# Patient Record
Sex: FEMALE | Race: BLACK OR AFRICAN AMERICAN
[De-identification: names, ages, dates, MRNs, and addresses within clinical notes are randomized per-mention and may not be internally consistent; named-entity substitution may affect disease eponyms.]

---

## 2020-04-03 ENCOUNTER — HOSPITAL ENCOUNTER (EMERGENCY)
Dept: HOSPITAL 62 - ER | Age: 40
Discharge: HOME | End: 2020-04-03
Payer: MEDICAID

## 2020-04-03 VITALS — DIASTOLIC BLOOD PRESSURE: 60 MMHG | SYSTOLIC BLOOD PRESSURE: 132 MMHG

## 2020-04-03 DIAGNOSIS — D25.9: ICD-10-CM

## 2020-04-03 DIAGNOSIS — R61: ICD-10-CM

## 2020-04-03 DIAGNOSIS — Z98.51: ICD-10-CM

## 2020-04-03 DIAGNOSIS — D72.829: ICD-10-CM

## 2020-04-03 DIAGNOSIS — N93.8: Primary | ICD-10-CM

## 2020-04-03 DIAGNOSIS — R51: ICD-10-CM

## 2020-04-03 DIAGNOSIS — F17.210: ICD-10-CM

## 2020-04-03 LAB
ADD MANUAL DIFF: NO
ALBUMIN SERPL-MCNC: 4.7 G/DL (ref 3.5–5)
ALP SERPL-CCNC: 69 U/L (ref 38–126)
ANION GAP SERPL CALC-SCNC: 8 MMOL/L (ref 5–19)
APPEARANCE UR: (no result)
APTT PPP: YELLOW S
AST SERPL-CCNC: 20 U/L (ref 14–36)
BASOPHILS # BLD AUTO: 0.1 10^3/UL (ref 0–0.2)
BASOPHILS NFR BLD AUTO: 0.5 % (ref 0–2)
BILIRUB DIRECT SERPL-MCNC: 0 MG/DL (ref 0–0.4)
BILIRUB SERPL-MCNC: 0.4 MG/DL (ref 0.2–1.3)
BILIRUB UR QL STRIP: NEGATIVE
BUN SERPL-MCNC: 12 MG/DL (ref 7–20)
CALCIUM: 10.1 MG/DL (ref 8.4–10.2)
CHLORIDE SERPL-SCNC: 101 MMOL/L (ref 98–107)
CO2 SERPL-SCNC: 29 MMOL/L (ref 22–30)
EOSINOPHIL # BLD AUTO: 0 10^3/UL (ref 0–0.6)
EOSINOPHIL NFR BLD AUTO: 0.2 % (ref 0–6)
ERYTHROCYTE [DISTWIDTH] IN BLOOD BY AUTOMATED COUNT: 14 % (ref 11.5–14)
GLUCOSE SERPL-MCNC: 105 MG/DL (ref 75–110)
GLUCOSE UR STRIP-MCNC: NEGATIVE MG/DL
HCT VFR BLD CALC: 38.2 % (ref 36–47)
HGB BLD-MCNC: 12.9 G/DL (ref 12–15.5)
KETONES UR STRIP-MCNC: NEGATIVE MG/DL
LYMPHOCYTES # BLD AUTO: 1.3 10^3/UL (ref 0.5–4.7)
LYMPHOCYTES NFR BLD AUTO: 9.8 % (ref 13–45)
MCH RBC QN AUTO: 29.1 PG (ref 27–33.4)
MCHC RBC AUTO-ENTMCNC: 33.8 G/DL (ref 32–36)
MCV RBC AUTO: 86 FL (ref 80–97)
MONOCYTES # BLD AUTO: 0.6 10^3/UL (ref 0.1–1.4)
MONOCYTES NFR BLD AUTO: 4.4 % (ref 3–13)
NEUTROPHILS # BLD AUTO: 11.4 10^3/UL (ref 1.7–8.2)
NEUTS SEG NFR BLD AUTO: 85.1 % (ref 42–78)
NITRITE UR QL STRIP: NEGATIVE
PH UR STRIP: 5 [PH] (ref 5–9)
PLATELET # BLD: 339 10^3/UL (ref 150–450)
POTASSIUM SERPL-SCNC: 4.5 MMOL/L (ref 3.6–5)
PROT SERPL-MCNC: 8.4 G/DL (ref 6.3–8.2)
PROT UR STRIP-MCNC: 30 MG/DL
RBC # BLD AUTO: 4.43 10^6/UL (ref 3.72–5.28)
SP GR UR STRIP: 1.02
TOTAL CELLS COUNTED % (AUTO): 100 %
UROBILINOGEN UR-MCNC: NEGATIVE MG/DL (ref ?–2)
WBC # BLD AUTO: 13.4 10^3/UL (ref 4–10.5)

## 2020-04-03 PROCEDURE — 76856 US EXAM PELVIC COMPLETE: CPT

## 2020-04-03 PROCEDURE — 84703 CHORIONIC GONADOTROPIN ASSAY: CPT

## 2020-04-03 PROCEDURE — 81001 URINALYSIS AUTO W/SCOPE: CPT

## 2020-04-03 PROCEDURE — 80053 COMPREHEN METABOLIC PANEL: CPT

## 2020-04-03 PROCEDURE — 99284 EMERGENCY DEPT VISIT MOD MDM: CPT

## 2020-04-03 PROCEDURE — 86900 BLOOD TYPING SEROLOGIC ABO: CPT

## 2020-04-03 PROCEDURE — 86901 BLOOD TYPING SEROLOGIC RH(D): CPT

## 2020-04-03 PROCEDURE — 86850 RBC ANTIBODY SCREEN: CPT

## 2020-04-03 PROCEDURE — 93976 VASCULAR STUDY: CPT

## 2020-04-03 PROCEDURE — 85025 COMPLETE CBC W/AUTO DIFF WBC: CPT

## 2020-04-03 PROCEDURE — 36415 COLL VENOUS BLD VENIPUNCTURE: CPT

## 2020-04-03 NOTE — RADIOLOGY REPORT (SQ)
EXAM DESCRIPTION:  U/S NON OB PEL W/DOPPLER



IMAGES COMPLETED DATE/TIME:  4/3/2020 2:23 pm



REASON FOR STUDY:  abnormal bleeding



COMPARISON:  None.



TECHNIQUE:  Dynamic and static grayscale images acquired of the pelvis via transabdominal approach an
d recorded on PACS. Additional selected color Doppler and spectral images recorded.



LIMITATIONS:  None.



FINDINGS:  UTERUS: Uterus measures 10.5 x 8.6 x 6.0 cm.  Intramural or submucosal hypoechoic mass padmini
suring 3.0 x 3.5 x 2.6 cm with increased vascularity compatible with a uterine fibroid.

ENDOMETRIAL STRIPE: Endometrial stripe is visualized and measures 1.5 cm in thickness.

CERVIX: No nabothian cysts.

RIGHT OVARY AND DOPPLER: Normal size measuring 3.4 x 2.5 x 2.1 cm. No worrisome masses. Normal arteri
al vascular flow without evidence for torsion. There is a right ovarian follicle measuring 1.7 cm.

LEFT OVARY AND DOPPLER: Normal size measuring 4.4 x 1.6 x 3.4 cm. No worrisome masses.   Normal arter
ial vascular flow without evidence for torsion.

FREE FLUID: Trace free fluid within the right adnexum.

OTHER: No other significant finding.



IMPRESSION:  1.  3.5 cm intramural or submucosal uterine fibroid possibly related to patient's sympto
ms.

2.  Dominant follicle within the right ovary measuring 1.7 cm with trace adnexal fluid, likely physio
logic.  This requires no follow-up.



TECHNICAL DOCUMENTATION:  JOB ID:  [id number]

 2011 [employer]- All Rights Reserved                          Rev-5/[id number]



Reading location - IP/workstation name: [email or web address]

## 2020-04-03 NOTE — ER DOCUMENT REPORT
Entered by CHAD RAO SCRIBE  20 [id number] 





Acting as scribe for:SHAHEED TIAN MD





ED General





- General


Chief Complaint: Vaginal Bleeding


Stated Complaint: VAGINAL BLEEDING


Time Seen by Provider: 20 12:07


Primary Care Provider: 


JER SOL MD [ACTIVE STAFF] - Follow up in 1 week


Mode of Arrival: Wheelchair


Information source: Patient


Notes: 





This 39-year-old female ( P:8 A:4) presents to the emergency department com

plaining of vaginal bleeding that began a couple days ago. Patient states that 

she has never had an irregular menstrual cycle. Patient said that her last 

menstrual period was last week, started on Wednesday and lasted 4 days. Patient 

said that her vaginal bleeding has had clots and has not been normal in the past

couple of days. Patient is not on birth control and has had a tubal ligation. 

Patient denies any new stress. Patient reports headache, right-sided pain, 

chills, diaphoresis, insomnia, and suprapubic pain. Patient denies dysuria, 

vaginal discharge and nose bleeds. Patient's last child was in 2016.





 





- Related Data


Allergies/Adverse Reactions: 


                                        





No Known Allergies Allergy (Unverified 20 12:14)


   











Past Medical History





- General


Information source: Patient





- Social History


Smoking Status: Current Every Day Smoker


Cigarette use (# per day): Yes


Chew tobacco use (# tins/day): No


Frequency of alcohol use: None


Drug Abuse: None


Lives with: Family


Family History: Reviewed & Not Pertinent


Patient has suicidal ideation: No


Patient has homicidal ideation: No





- Medical History


Medical History: Negative


Past Surgical History: Reports: Hx Tubal Ligation





Review of Systems





- Review of Systems


Constitutional: See HPI, Chills, Diaphoresis


EENT: See HPI.  denies: Nose discharge


Cardiovascular: No symptoms reported


Respiratory: No symptoms reported


Gastrointestinal: See HPI, Abdominal pain


Genitourinary: See HPI.  denies: Dysuria


Female Genitourinary: See HPI, Last menstrual period, Vaginal bleeding.  denies:

Vaginal discharge


Musculoskeletal: No symptoms reported


Skin: No symptoms reported


Hematologic/Lymphatic: No symptoms reported


Neurological/Psychological: See HPI, Headaches


-: Yes All other systems reviewed and negative





Physical Exam





- Vital signs


Vitals: 


                                        











Temp Pulse Resp BP Pulse Ox


 


 98.4 F   71   18   149/81 H  96 


 


 20 12:07  20 12:07  20 12:07  20 12:07  20 12:07














- Notes


Notes: 





Physical Exam:


 


General: Alert, appears well. 


 


HEENT: Normocephalic. Atraumatic. PERRL. Extraocular movements intact. 

Oropharynx clear.


 


Neck: Supple. Non-tender.


 


Respiratory: No respiratory distress. Clear and equal breath sounds bilaterally.


 


Cardiovascular: Regular rate and rhythm. 


 


Abdominal: Normal Inspection. Non-tender. No distension. Normal Bowel Sounds. 


 


Back: No gross abnormalities. 


 


Extremities: Moves all four extremities.


Upper extremities: Normal inspection. Normal ROM.  


Lower extremities: Normal inspection. No edema. Normal ROM.


 


Neurological: Normal cognition. AAOx4. Normal speech.  


 


Psychological: Normal affect. Normal Mood. 


 


Skin: Warm. Dry. Normal color.





Course





- Re-evaluation


Re-evalutation: 





20 19:57


Patient rested in exam room comfortably waiting on her results.





- Vital Signs


Vital signs: 


                                        











Temp Pulse Resp BP Pulse Ox


 


 98.2 F   82   18   132/60 H  100 


 


 20 16:25  20 16:25  20 16:25  20 16:25  20 16:25














- Laboratory


Result Diagrams: 


                                 20 12:19





                                 20 12:19


Laboratory results interpreted by me: 


                                        











  20





  12:19 12:19 13:30


 


WBC  13.4 H  


 


Lymph % (Auto)  9.8 L  


 


Absolute Neuts (auto)  11.4 H  


 


Seg Neutrophils %  85.1 H  


 


Total Protein   8.4 H 


 


Urine Protein    30 H


 


Urine Blood    SMALL H











20 19:57


Patient had an elevated white blood cell count of 13.4 without an obvious source

of infection.  Patient's presentation was vaginal bleed abnormal because she had

just had a menstrual cycle 1 week ago.  Patient has had a bilateral tubal 

ligation in the past which is only surgery in the pelvic region.  Denied any 

cough or any source of any fever.





- Diagnostic Test


Radiology reviewed: Image reviewed, Reports reviewed


Radiology results interpreted by me: 





20 19:58


Ultrasound of pelvis transvaginal disclose that there was a fibroid uterus about

3 cm noted.  Also follicle on the left ovary consistent with a benign-appearing 

follicle.  Trace amount of fluid in the cul-de-sac which is considered 

physiologic.  No other abnormalities were noted.





Discharge





- Discharge


Clinical Impression: 


 Abnormal vaginal bleeding, Uterine fibroid, Leukocytosis





Condition: Stable


Disposition: HOME, SELF-CARE


Instructions:  Vaginal Bleeding ([facility])


Prescriptions: 


Ciprofloxacin HCl [Cipro 500 mg Tablet] 500 mg PO BID #20 tablet


Ibuprofen [Motrin 800 mg Tablet] 800 mg PO Q8H PRN #30 tab


 PRN Reason: pain/fever/swelling


Referrals: 


JER SOL MD [ACTIVE STAFF] - Follow up in 1 week





I personally performed the services described in the documentation, reviewed and

edited the documentation which was dictated to the scribe in my presence, and it

accurately records my words and actions.

## 2020-04-03 NOTE — ER DOCUMENT REPORT
ED Medical Screen (RME)





- General


Chief Complaint: Vaginal Bleeding


Stated Complaint: VAGINAL BLEEDING


Time Seen by Provider: 20 12:07


Mode of Arrival: Wheelchair


Information source: Patient


Notes: 





39-year-old female  presents with heavy vaginal bleeding for almost a week.

 Reports she had a normal menses -.  She reports she started 

bleeding heavily on .  She reports she has gone through 22 super Kotex 

pads in the past 24 hours.  She reports at times she has to wear 3 Kotex pads 

due to the bleeding.  She complains of severe abdominal cramping with right sammi

ulder pain and headache.  She has not taken anything for the pain.  Denies fever

vomiting diarrhea.  She denies history of anemia.  Denies history of heavy 

menses.  No recent trips, does not work, no known exposure to COVID 19 














I have greeted and performed a rapid initial assessment of this patient.  A 

comprehensive ED assessment and evaluation of the patient, analysis of test 

results and completion of the medical decision making process will be conducted 

by additional ED providers.





- Related Data


Allergies/Adverse Reactions: 


                                        





No Known Allergies Allergy (Unverified 20 12:14)


   











Past Medical History





- Social History


Chew tobacco use (# tins/day): No


Frequency of alcohol use: None


Drug Abuse: None





Physical Exam





- Vital signs


Vitals: 





                                        











Temp Pulse Resp BP Pulse Ox


 


 98.4 F   71   18   149/81 H  96 


 


 20 12:07  20 12:07  20 12:07  20 12:07  20 12:07














Course





- Vital Signs


Vital signs: 





                                        











Temp Pulse Resp BP Pulse Ox


 


 98.4 F   71   18   149/81 H  96 


 


 20 12:07  20 12:07  20 12:07  20 12:07  20 12:07